# Patient Record
(demographics unavailable — no encounter records)

---

## 2019-02-17 NOTE — XMS REPORT
Patient Summary Document

                             Created on: 2019



ESTELLA PEDERSEN

External Reference #: [id number]

: 1982

Sex: Female



Demographics







                          Address                   [address] [place] [address]  [address]

 

                          Home Phone                [phone]

 

                          Preferred Language        Unknown

 

                          Marital Status            Unknown

 

                          [employer] Affiliation     Unknown

 

                          Race                      Unknown

 

                                        Additional Race(s)  

 

                          Ethnic Group              Unknown





Author







                          Author                    [facility]

 

                          [facility]

 

                          Address                   Unknown

 

                          Phone                     Unavailable







Care Team Providers







                    Care Team Member Name    Role                Phone

 

                    CHONG OLMEDO       Unavailable         Unavailable







Problems

This patient has no known problems.



Allergies, Adverse Reactions, Alerts

This patient has no known allergies or adverse reactions.



Medications

This patient has no known medications.



Results







           Test Description    Test Time    Test Comments    Text Results    Atomic Results    Result

 Comments

 

                CHEST 2 VIEWS                                        [place] [address]      Patient Name: ESTELLA PEDERSEN   MR #: 
[id number]    : 1982 Age/Sex: 35/F  Acct #: [id number] Req #: [id number]
[id number]  Adm Physician:     Ordered by: JAMA OLMEDO MD  Report #: [id number]   
Location: ER  Room/Bed:     
_________________________________________________________________________________
__________________    Procedure: [id number] DX/CHEST 2 VIEWS  Exam Date: 17
                           Exam Time: 1120       REPORT STATUS: Signed    
PROCEDURE:   CHEST 2 VIEWS   TECHNIQUE:   PA and lateral chest   INDICATION:   
Cough, congestion; possible flu.   COMPARISON:   None.       FINDINGS:   Lungs 
are clear and symmetrically inflated. No pleural effusions.    Normal heart 
size, mediastinal contour, and pulmonary vasculature.    Questionable trace 
central peribronchial thickening. Intact skeleton.           CONCLUSION:   Mild 
bronchial thickening may reflect bronchitis, atypical, or viral    pneumonia in 
the appropriate context.                Dictated by:  Denton Dumont M.D. 
on 2017 at 11:51        Electronically approved by:  Denton Dumont M.D. on 2017 at    11:51                Dictated By: DENTON DUMONT MD  
Electronically Signed By: DENTON DUMONT MD on 17 1151  Transcribed By: 
MELANIE on 17 1151       COPY TO:   JAMA OLMEDO MD

## 2019-02-17 NOTE — DIAGNOSTIC IMAGING REPORT
EXAMINATION:  CXR 1 W - HOP    



INDICATION: Chest pain. 



COMPARISON:  None

     

FINDINGS:

TUBES and LINES:  None.



LUNGS:  Lungs are well inflated.  Lungs are clear.   There is no evidence of

pneumonia or pulmonary edema.



PLEURA:  No pleural effusion or pneumothorax. 



HEART AND MEDIASTINUM:  The cardiomediastinal silhouette is unremarkable.    



BONES AND SOFT TISSUES:  No acute osseous lesion.  Soft tissues are

unremarkable.



UPPER ABDOMEN: No free air under the diaphragm.    



IMPRESSION: 

No acute radiographic abnormality. 



Signed by: Dr. Denice Rand MD on 2/17/2019 12:44 PM